# Patient Record
(demographics unavailable — no encounter records)

---

## 2024-12-17 NOTE — ASSESSMENT
[FreeTextEntry1] : 67 y/o M with PMHx of DM, Hypogonadism, hypothyroidism, hemochromatosis and hepatocellular carcinoma presents for a 3 month follow up. Pt states that he is complaint with his medications. Pt states that he fell off a trunk while moving wood chips and landed on his back. Pt states that he has no pain. Pt denies any other concerns or complaints. Pt denies any other recent hospitalizations, trauma, illnesses. Pt denies fever, chills, headache, sob, chest pain, abdominal pain.  - Care plan reviewed  - Labs drawn  - Medications reviewed - RTC 3 months

## 2024-12-17 NOTE — ASSESSMENT
[FreeTextEntry1] : 69 y/o M with PMHx of DM, Hypogonadism, hypothyroidism, hemochromatosis and hepatocellular carcinoma presents for a 3 month follow up. Pt states that he is complaint with his medications. Pt states that he fell off a trunk while moving wood chips and landed on his back. Pt states that he has no pain. Pt denies any other concerns or complaints. Pt denies any other recent hospitalizations, trauma, illnesses. Pt denies fever, chills, headache, sob, chest pain, abdominal pain.  - Care plan reviewed  - Labs drawn  - Medications reviewed - RTC 3 months

## 2024-12-18 NOTE — REVIEW OF SYSTEMS
[Fatigue] : fatigue [Nocturia] : nocturia [Joint Pain] : joint pain [Joint Stiffness] : joint stiffness [Joint Swelling] : joint swelling [Negative] : Heme/Lymph [Fever] : no fever [Vision Problems] : no vision problems [Hoarseness] : no hoarseness [Chest Pain] : no chest pain [Claudication] : no  leg claudication [Orthopena] : no orthopnea [Shortness Of Breath] : no shortness of breath [Nausea] : no nausea [Heartburn] : no heartburn [Muscle Pain] : no muscle pain [Muscle Weakness] : no muscle weakness [Back Pain] : no back pain [FreeTextEntry3] : slight scleral icterus

## 2024-12-18 NOTE — HEALTH RISK ASSESSMENT
[1 or 2 (0 pts)] : 1 or 2 (0 points) [Never (0 pts)] : Never (0 points) [No] : In the past 12 months have you used drugs other than those required for medical reasons? No [No falls in past year] : Patient reported no falls in the past year [0] : 2) Feeling down, depressed, or hopeless: Not at all (0) [PHQ-2 Negative - No further assessment needed] : PHQ-2 Negative - No further assessment needed [Patient/Caregiver not ready to engage] : , patient/caregiver not ready to engage [I will adhere to the patient's wishes.] : I will adhere to the patient's wishes. [Time Spent: ___ minutes] : Time Spent: [unfilled] minutes [Never] : Never [Audit-CScore] : 0 [de-identified] : average  [de-identified] : average  [facility] [[id number]] : 0 [AdvancecareDate] : 03/24

## 2024-12-18 NOTE — PLAN
[FreeTextEntry1] : 68 y/o M with PMHx of DM, Hypogonadism, hypothyroidism, hemochromatosis and hepatocellular carcinoma presents for a 3 month follow up. Pt states that he is complaint with his medications. Pt denies any other concerns or complaints. Pt denies any other recent hospitalizations, trauma, illnesses. Pt denies fever, chills, headache, sob, chest pain, abdominal pain.  - Care plan reviewed  - Labs drawn  - Medications reviewed - RTC 3 months

## 2024-12-18 NOTE — HEALTH RISK ASSESSMENT
[1 or 2 (0 pts)] : 1 or 2 (0 points) [Never (0 pts)] : Never (0 points) [No] : In the past 12 months have you used drugs other than those required for medical reasons? No [No falls in past year] : Patient reported no falls in the past year [0] : 2) Feeling down, depressed, or hopeless: Not at all (0) [PHQ-2 Negative - No further assessment needed] : PHQ-2 Negative - No further assessment needed [Patient/Caregiver not ready to engage] : , patient/caregiver not ready to engage [I will adhere to the patient's wishes.] : I will adhere to the patient's wishes. [Time Spent: ___ minutes] : Time Spent: [unfilled] minutes [Never] : Never [Audit-CScore] : 0 [de-identified] : average  [de-identified] : average  [facility] [[id number]] : 0 [AdvancecareDate] : 03/24

## 2024-12-18 NOTE — COUNSELING
[Fall prevention counseling provided] : Fall prevention counseling provided [Adequate lighting] : Adequate lighting [No throw rugs] : No throw rugs [Use proper foot wear] : Use proper foot wear [Behavioral health counseling provided] : Behavioral health counseling provided [Sleep ___ hours/day] : Sleep [unfilled] hours/day [Engage in a relaxing activity] : Engage in a relaxing activity [AUDIT-C Screening administered and reviewed] : AUDIT-C Screening administered and reviewed [Potential consequences of obesity discussed] : Potential consequences of obesity discussed [Benefits of weight loss discussed] : Benefits of weight loss discussed [Encouraged to increase physical activity] : Encouraged to increase physical activity [Weigh Self Weekly] : weigh self weekly [Decrease Portions] : decrease portions [None] : None [Good understanding] : Patient has a good understanding of lifestyle changes and steps needed to achieve self management goal [FreeTextEntry2] : Former Smoker

## 2024-12-18 NOTE — HISTORY OF PRESENT ILLNESS
[FreeTextEntry1] : 68 y/o M with PMHx of DM, Hypogonadism, hypothyroidism, hemochromatosis and hepatocellular carcinoma presents for a 3 month follow up.  [de-identified] : 70 y/o M with PMHx of DM, Hypogonadism, hypothyroidism, hemochromatosis and hepatocellular carcinoma presents for a 3 month follow up. Pt states that he is complaint with his medications. Pt denies any other concerns or complaints. Pt denies any other recent hospitalizations, trauma, illnesses. Pt denies fever, chills, headache, sob, chest pain, abdominal pain.

## 2024-12-18 NOTE — HISTORY OF PRESENT ILLNESS
[FreeTextEntry1] : 68 y/o M with PMHx of DM, Hypogonadism, hypothyroidism, hemochromatosis and hepatocellular carcinoma presents for a 3 month follow up.  [de-identified] : 70 y/o M with PMHx of DM, Hypogonadism, hypothyroidism, hemochromatosis and hepatocellular carcinoma presents for a 3 month follow up. Pt states that he is complaint with his medications. Pt denies any other concerns or complaints. Pt denies any other recent hospitalizations, trauma, illnesses. Pt denies fever, chills, headache, sob, chest pain, abdominal pain.

## 2025-03-19 NOTE — HEALTH RISK ASSESSMENT
[1 or 2 (0 pts)] : 1 or 2 (0 points) [Never (0 pts)] : Never (0 points) [No] : In the past 12 months have you used drugs other than those required for medical reasons? No [No falls in past year] : Patient reported no falls in the past year [0] : 2) Feeling down, depressed, or hopeless: Not at all (0) [PHQ-2 Negative - No further assessment needed] : PHQ-2 Negative - No further assessment needed [Patient/Caregiver not ready to engage] : , patient/caregiver not ready to engage [I will adhere to the patient's wishes.] : I will adhere to the patient's wishes. [Time Spent: ___ minutes] : Time Spent: [unfilled] minutes [Never] : Never [Audit-CScore] : 0 [de-identified] : average  [de-identified] : average  [[facility]] : 9 [[id number]] : 0 [AdvancecareDate] : 03/24

## 2025-03-19 NOTE — PLAN
[FreeTextEntry1] : 68 y/o M with PMHx of DM, Hypogonadism, hypothyroidism, hemochromatosis and hepatocellular carcinoma presents for a 3 month follow up. Pt states that he is complaint with his medications. Pt c/o floater in b/l eyes, which started a few months ago, and he states he has his annual appointment with ophthalmology scheduled in May 2025. Pt denies any other concerns or complaints. Pt denies any other recent hospitalizations, trauma, illnesses. Pt denies fever, chills, headache, sob, chest pain, abdominal pain.   - Care plan reviewed  - Labs drawn  - Medications reviewed - RTC 3 months

## 2025-03-19 NOTE — HISTORY OF PRESENT ILLNESS
[FreeTextEntry1] : 70 y/o M with PMHx of DM, Hypogonadism, hypothyroidism, hemochromatosis and hepatocellular carcinoma presents for a 3 month follow up.  [de-identified] : 68 y/o M with PMHx of DM, Hypogonadism, hypothyroidism, hemochromatosis and hepatocellular carcinoma presents for a 3 month follow up. Pt states that he is complaint with his medications. Pt c/o floater in b/l eyes, which started a few months ago, and he states he has his annual appointment with ophthalmology scheduled in May 2025. Pt denies any other concerns or complaints. Pt denies any other recent hospitalizations, trauma, illnesses. Pt denies fever, chills, headache, sob, chest pain, abdominal pain.

## 2025-03-19 NOTE — HISTORY OF PRESENT ILLNESS
[FreeTextEntry1] : 70 y/o M with PMHx of DM, Hypogonadism, hypothyroidism, hemochromatosis and hepatocellular carcinoma presents for a 3 month follow up.  [de-identified] : 70 y/o M with PMHx of DM, Hypogonadism, hypothyroidism, hemochromatosis and hepatocellular carcinoma presents for a 3 month follow up. Pt states that he is complaint with his medications. Pt c/o floater in b/l eyes, which started a few months ago, and he states he has his annual appointment with ophthalmology scheduled in May 2025. Pt denies any other concerns or complaints. Pt denies any other recent hospitalizations, trauma, illnesses. Pt denies fever, chills, headache, sob, chest pain, abdominal pain.

## 2025-03-19 NOTE — HEALTH RISK ASSESSMENT
[1 or 2 (0 pts)] : 1 or 2 (0 points) [Never (0 pts)] : Never (0 points) [No] : In the past 12 months have you used drugs other than those required for medical reasons? No [No falls in past year] : Patient reported no falls in the past year [0] : 2) Feeling down, depressed, or hopeless: Not at all (0) [PHQ-2 Negative - No further assessment needed] : PHQ-2 Negative - No further assessment needed [Patient/Caregiver not ready to engage] : , patient/caregiver not ready to engage [I will adhere to the patient's wishes.] : I will adhere to the patient's wishes. [Time Spent: ___ minutes] : Time Spent: [unfilled] minutes [Never] : Never [Audit-CScore] : 0 [de-identified] : average  [de-identified] : average  [facility] [[id number]] : 0 [AdvancecareDate] : 03/24

## 2025-03-19 NOTE — PLAN
[FreeTextEntry1] : 70 y/o M with PMHx of DM, Hypogonadism, hypothyroidism, hemochromatosis and hepatocellular carcinoma presents for a 3 month follow up. Pt states that he is complaint with his medications. Pt c/o floater in b/l eyes, which started a few months ago, and he states he has his annual appointment with ophthalmology scheduled in May 2025. Pt denies any other concerns or complaints. Pt denies any other recent hospitalizations, trauma, illnesses. Pt denies fever, chills, headache, sob, chest pain, abdominal pain.   - Care plan reviewed  - Labs drawn  - Medications reviewed - RTC 3 months